# Patient Record
Sex: FEMALE | Race: WHITE
[De-identification: names, ages, dates, MRNs, and addresses within clinical notes are randomized per-mention and may not be internally consistent; named-entity substitution may affect disease eponyms.]

---

## 2018-04-27 ENCOUNTER — HOSPITAL ENCOUNTER (OUTPATIENT)
Dept: HOSPITAL 62 - SC | Age: 33
Discharge: HOME | End: 2018-04-27
Attending: OPHTHALMOLOGY
Payer: OTHER GOVERNMENT

## 2018-04-27 DIAGNOSIS — H11.001: Primary | ICD-10-CM

## 2018-04-27 DIAGNOSIS — H11.152: ICD-10-CM

## 2018-04-27 DIAGNOSIS — E03.9: ICD-10-CM

## 2018-04-27 DIAGNOSIS — R07.89: ICD-10-CM

## 2018-04-27 DIAGNOSIS — Z79.899: ICD-10-CM

## 2018-04-27 PROCEDURE — 65426 REMOVAL OF EYE LESION: CPT

## 2018-04-27 PROCEDURE — 88305 TISSUE EXAM BY PATHOLOGIST: CPT

## 2018-04-27 RX ADMIN — CYCLOPENTOLATE HYDROCHLORIDE AND PHENYLEPHRINE HYDROCHLORIDE PRN DROP: 2; 10 SOLUTION/ DROPS OPHTHALMIC at 07:33

## 2018-04-27 RX ADMIN — HYALURONIDASE ONE UNIT: 150 INJECTION SUBCUTANEOUS at 08:35

## 2018-04-27 RX ADMIN — BESIFLOXACIN PRN DROP: 6 SUSPENSION OPHTHALMIC at 07:50

## 2018-04-27 RX ADMIN — BESIFLOXACIN PRN DROP: 6 SUSPENSION OPHTHALMIC at 07:20

## 2018-04-27 RX ADMIN — TETRACAINE HYDROCHLORIDE PRN DROP: 25 LIQUID OPHTHALMIC at 07:19

## 2018-04-27 RX ADMIN — CYCLOPENTOLATE HYDROCHLORIDE AND PHENYLEPHRINE HYDROCHLORIDE PRN DROP: 2; 10 SOLUTION/ DROPS OPHTHALMIC at 07:20

## 2018-04-27 RX ADMIN — BESIFLOXACIN PRN DROP: 6 SUSPENSION OPHTHALMIC at 07:33

## 2018-04-27 RX ADMIN — HYALURONIDASE ONE UNIT: 150 INJECTION SUBCUTANEOUS at 08:40

## 2018-04-27 RX ADMIN — TETRACAINE HYDROCHLORIDE PRN DROP: 25 LIQUID OPHTHALMIC at 08:20

## 2018-04-27 RX ADMIN — CYCLOPENTOLATE HYDROCHLORIDE AND PHENYLEPHRINE HYDROCHLORIDE PRN DROP: 2; 10 SOLUTION/ DROPS OPHTHALMIC at 07:49

## 2018-04-27 RX ADMIN — TETRACAINE HYDROCHLORIDE PRN DROP: 25 LIQUID OPHTHALMIC at 08:16

## 2018-04-27 NOTE — [facility] DISCHARGE SUMMARY E
[facility] Discharge Summary



NAME: AMOL DE GUZMAN

                                      MRN: [id number]

                                AGE: 32Y

ADMITTED: 04/27/2018           DISCHARGED: 04/27/2018



This is a 32-year-old female who underwent pterygium excision with amniotic

membrane and Mitomycin C.



DIAGNOSIS:

Pterygium of the right eye.



INDICATIONS:

The patient underwent surgery because she was having significant pain and

discomfort due to the elevation of the pterygium.  This was attempted to be

treated medically with drops.  However, the patient was still very

uncomfortable, and therefore, it was decided to remove it.  The specimen

was sent to pathology.



DISCHARGE INSTRUCTIONS:

The patient is to use TobraDex in the eye 3 times a day starting postop day

1.  Today, they are supposed to leave the pressure patch on and the rigid

shield, and they will take that off in the morning.  They are to be on a

regular diet.  They were given the on call number to call us if they have

any questions or concerns, and I will see them for a 1 week postoperative

visit.







DICTATING PHYSICIAN: LADY ROMANO M.D.



1952M              DT: 04/27/2018 1402

PHY#: [id number]         DD: 04/27/2018 1303

ID:   [id number]               JOB#: [id number]       ACCT: [id number]



cc:LADY ROMANO M.D.

>

## 2018-04-27 NOTE — [facility] OPERATIVE REPORT E
[facility] Operative Report



NAME: AMOL DE GUZMAN

                                      MRN: [id number]

                             AGE: 32Y

DATE OF SURGERY: 04/27/2018           ROOM:



PREOPERATIVE DIAGNOSIS:

Pterygium of the right eye, progressive.



POSTOPERATIVE DIAGNOSIS:

Pterygium of the right eye, progressive.



OPERATION:

Pterygium excision with use of amniotic membrane as well as mitomycin-C.



SURGEON:

LADY ROMANO M.D.



ANESTHESIA:

Retrobulbar block of 2% Lidocaine and 0.75% Marcaine in a 50/50 mixture

along with 0.75 units of Hyaluronidase.



PROCEDURE:

After obtaining appropriate informed consent, the patient's right eye was

prepped and draped in sterile fashion.  After a retrobulbar block was

performed, in a sterile manner, pterygium surgery was started.  Attention

was made to the nasal pterygium.  The remainder of the retrobulbar block

mixture was injected with a 25-gauge needle subconjunctivally to balloon up

the appropriate pterygium.  A marking pen was used to outline the area to

be excised.  Then a 0.5 forceps and [place] were used to dissect along the

outlined margins.  Following this, the underlying tenons was removed and

undermined as well.  Hemostasis was achieved with cautery.  Attention was

made to the corneal section of the pterygium where this was peeled off and

then smoothed with a [place] blade.  At this time, Mitomycin-soaked sponges

were brought to the sterile field and placed subconjunctivally along the

margins and left to soak there for approximately 2 minutes which was timed.

Copious irrigation.  These were removed and then copious irrigation was

performed to make sure that all of the Mitomycin was flushed out of the

eye.  Next, a caliper was used to measure the remaining defect in the

conjunctiva.  An amniotic membrane that was dry was then cut to fill this

space making sure to leave it a little large so that the conjunctiva could

overlap its margins.  The Tisse[place] glue was then applied to the amniotic

membrane and found to be in excellent position with overlying *------* and

all its borders and up to the limbus.  This was smoothed out with [place] 

and found to be in great position.  Following this, Tobradex was instilled

into the eye, and the speculum was removed, and a pressure patch was then

placed due to *------* retrobulbar block.  The patient woke up in postop

recovery in stable condition and tolerated the procedure well.





DICTATING PHYSICIAN: LADY ROMANO M.D.



1952M              DT: 04/27/2018 1346

PHY#: [id number]         DD: 04/27/2018 1303

ID:   [id number]               JOB#: [id number]       ACCT: [id number]



cc:LADY ROMANO M.D.

>